# Patient Record
Sex: MALE | Race: WHITE
[De-identification: names, ages, dates, MRNs, and addresses within clinical notes are randomized per-mention and may not be internally consistent; named-entity substitution may affect disease eponyms.]

---

## 2018-06-15 ENCOUNTER — HOSPITAL ENCOUNTER (OUTPATIENT)
Dept: HOSPITAL 89 - LAB | Age: 68
End: 2018-06-15
Attending: INTERNAL MEDICINE
Payer: MEDICARE

## 2018-06-15 DIAGNOSIS — M79.89: Primary | ICD-10-CM

## 2018-06-15 DIAGNOSIS — E11.9: ICD-10-CM

## 2018-06-15 LAB — PLATELET COUNT, AUTOMATED: 223 K/UL (ref 150–450)

## 2018-06-15 PROCEDURE — 84295 ASSAY OF SERUM SODIUM: CPT

## 2018-06-15 PROCEDURE — 84520 ASSAY OF UREA NITROGEN: CPT

## 2018-06-15 PROCEDURE — 84460 ALANINE AMINO (ALT) (SGPT): CPT

## 2018-06-15 PROCEDURE — 86140 C-REACTIVE PROTEIN: CPT

## 2018-06-15 PROCEDURE — 82040 ASSAY OF SERUM ALBUMIN: CPT

## 2018-06-15 PROCEDURE — 82247 BILIRUBIN TOTAL: CPT

## 2018-06-15 PROCEDURE — 84450 TRANSFERASE (AST) (SGOT): CPT

## 2018-06-15 PROCEDURE — 82565 ASSAY OF CREATININE: CPT

## 2018-06-15 PROCEDURE — 84132 ASSAY OF SERUM POTASSIUM: CPT

## 2018-06-15 PROCEDURE — 82310 ASSAY OF CALCIUM: CPT

## 2018-06-15 PROCEDURE — 84155 ASSAY OF PROTEIN SERUM: CPT

## 2018-06-15 PROCEDURE — 84550 ASSAY OF BLOOD/URIC ACID: CPT

## 2018-06-15 PROCEDURE — 82947 ASSAY GLUCOSE BLOOD QUANT: CPT

## 2018-06-15 PROCEDURE — 84075 ASSAY ALKALINE PHOSPHATASE: CPT

## 2018-06-15 PROCEDURE — 82435 ASSAY OF BLOOD CHLORIDE: CPT

## 2018-06-15 PROCEDURE — 82374 ASSAY BLOOD CARBON DIOXIDE: CPT

## 2018-06-15 PROCEDURE — 83036 HEMOGLOBIN GLYCOSYLATED A1C: CPT

## 2018-06-15 PROCEDURE — 36415 COLL VENOUS BLD VENIPUNCTURE: CPT

## 2018-06-15 PROCEDURE — 85025 COMPLETE CBC W/AUTO DIFF WBC: CPT

## 2018-06-15 NOTE — RADIOLOGY IMAGING REPORT
FACILITY: [facility] [place] 

 

PATIENT NAME: Selvin Young

: 1950

MR: [id number]

V: [id number]

EXAM DATE: 

ORDERING PHYSICIAN: SHARIF VELASCO

TECHNOLOGIST: 

 

Location: [facility]

Patient: Selvin Young

: 1950

MRN: [id number]

Visit/Account:[id number]

Date of Sevice:  6/15/2018

 

ACCESSION #: [id number]

 

VENOUS DOPP LOW LEFT EXTREMITY

 

HISTORY:  foot swelling for five days

 

COMPARISON:  None.

 

FINDINGS:

Grayscale, duplex and color Doppler interrogation of the left lower extremity deep veins from common 
femoral vein to proximal calf was completed. The greater saphenous vein in the proximal thigh was melinda
luated using similar technique.

 

Common femoral vein - Negative.

Femoral vein - Negative.

Deep femoral vein - Negative.

Popliteal vein - Negative.

Visualized deep calf veins - Negative.

 

Popliteal fossa: Negative.

Greater saphenous vein in the proximal thigh: Negative.

 

 

IMPRESSION:

No DVT.

 

Report Dictated By: Efren Conner MD at 6/15/2018 11:43 AM

 

Report E-Signed By: Efren Conner MD  at 6/15/2018 11:44 AM

 

WSN:[id number]

## 2018-09-28 ENCOUNTER — TRANSCRIBE ORDERS (OUTPATIENT)
Dept: ADMINISTRATIVE | Facility: HOSPITAL | Age: 68
End: 2018-09-28

## 2018-09-28 DIAGNOSIS — K12.1 ULCER (TRAUMATIC) OF ORAL MUCOSA: Primary | ICD-10-CM

## 2018-10-10 ENCOUNTER — HOSPITAL ENCOUNTER (OUTPATIENT)
Dept: MRI IMAGING | Facility: HOSPITAL | Age: 68
Discharge: HOME | End: 2018-10-10
Attending: PODIATRIST
Payer: MEDICARE

## 2018-10-10 DIAGNOSIS — K12.1 ULCER (TRAUMATIC) OF ORAL MUCOSA: ICD-10-CM

## 2018-10-10 PROCEDURE — 73718 MRI LOWER EXTREMITY W/O DYE: CPT
